# Patient Record
(demographics unavailable — no encounter records)

---

## 2024-11-05 NOTE — HISTORY OF PRESENT ILLNESS
[8] : 8 [6] : 6 [Throbbing] : throbbing [Constant] : constant [Household chores] : household chores [Leisure] : leisure [Rest] : rest [de-identified] : 14 year old female presents b/l Dorsal wrist pain R>L. She has pain with writing. No injury. She has been splinting at night  [] : no [FreeTextEntry5] : She reports having abnormalities in both wrists, will randomly experience pain due to this. Previously saw Dr. Mckeon for this issue in 2021. Reports symptoms resumed ~1 month ago, no new injury. [FreeTextEntry1] : Right wrist [FreeTextEntry9] : Wrist guard [de-identified] : Writing

## 2024-11-05 NOTE — PHYSICAL EXAM
[Left] : left hand [Dorsal Wrist] : dorsal wrist [Volar Wrist] : volar wrist [Right] : right wrist [There are no fractures, subluxations or dislocations. No significant abnormalities are seen] : There are no fractures, subluxations or dislocations. No significant abnormalities are seen [] : no erythema [FreeTextEntry9] : ROM is full. Pain at terminal flexion & extension.

## 2024-11-05 NOTE — DISCUSSION/SUMMARY
[de-identified] : Discussed the nature of the diagnosis and risk and benefits of different modalities of treatment. B/l Wrist synovitis X rays reviewed & discussed She will do lower extremity gym She will avoid forced flexion & extension   She will apply warm compresses & take NAID's  RTO 3 weeks

## 2024-12-16 NOTE — HISTORY OF PRESENT ILLNESS
[Throbbing] : throbbing [Household chores] : household chores [Leisure] : leisure [Rest] : rest [de-identified] : 14 year old female followed for B/l Wrist synovitis. She has been avoiding forced flexion & extension, applying warm compresses & taking NSAID's. She reports she is still with pain but improved.   [] : no [FreeTextEntry1] : Greg wrists [FreeTextEntry9] : Wrist guard [de-identified] : Writing, bending/flexing [de-identified] : NSAIDS, warm compress

## 2024-12-16 NOTE — DISCUSSION/SUMMARY
[de-identified] : Discussed the nature of the diagnosis and risk and benefits of different modalities of treatment. B/l Wrist synovitis She will obtain an MRI Rx provided  She will avoid forced flexion & extension   RTO after MRI

## 2024-12-31 NOTE — PHYSICAL EXAM
[Right] : right hand [Dorsal Wrist] : dorsal wrist [5___] : finger abductor 5[unfilled]/5 [] : negative Phalen's [FreeTextEntry9] : Pain at terminal extension

## 2024-12-31 NOTE — DATA REVIEWED
[MRI] : MRI [Right] : of the right [Wrist] : wrist [I independently reviewed and interpreted images and report] : I independently reviewed and interpreted images and report [I reviewed the films/CD and agree] : I reviewed the films/CD and agree [FreeTextEntry1] : Report not available

## 2024-12-31 NOTE — HISTORY OF PRESENT ILLNESS
[3] : 3 [Localized] : localized [Sharp] : sharp [Throbbing] : throbbing [Intermittent] : intermittent [Household chores] : household chores [Leisure] : leisure [Rest] : rest [Student] : Work status: student [de-identified] : 14 year old female followed for B/l Wrist synovitis. She has been avoiding forced flexion & extension, applying warm compresses & taking NSAID's. She comes in with MRI. She reports she is still experiencing wrist pain   [] : no [FreeTextEntry1] : Greg wrists [FreeTextEntry5] : follow up for MRI rev. pt stated no changes with pin in wrist  [FreeTextEntry9] : Wrist guard [de-identified] : Writing, bending/flexing [de-identified] : NSAIDS, warm compress

## 2024-12-31 NOTE — DISCUSSION/SUMMARY
[de-identified] : Discussed the nature of the diagnosis and risk and benefits of different modalities of treatment. B/l Wrist synovitis MRI reviewed & discussed She will return to gym She was referred to Rheumatologist  PRN